# Patient Record
Sex: MALE | Race: WHITE | NOT HISPANIC OR LATINO | Employment: UNEMPLOYED | URBAN - METROPOLITAN AREA
[De-identification: names, ages, dates, MRNs, and addresses within clinical notes are randomized per-mention and may not be internally consistent; named-entity substitution may affect disease eponyms.]

---

## 2021-05-10 PROBLEM — R09.89 SUSPECTED CHF (CONGESTIVE HEART FAILURE): Status: ACTIVE | Noted: 2021-05-10

## 2021-05-10 PROBLEM — E88.09 HYPOALBUMINEMIA: Status: ACTIVE | Noted: 2021-05-10

## 2021-05-10 PROBLEM — I10 ESSENTIAL HYPERTENSION: Status: ACTIVE | Noted: 2021-05-10

## 2021-05-10 PROBLEM — R06.09 DYSPNEA ON EXERTION: Status: ACTIVE | Noted: 2021-05-10

## 2021-05-10 PROBLEM — R06.01 ORTHOPNEA: Status: ACTIVE | Noted: 2021-05-10

## 2021-05-10 PROBLEM — R94.31 ABNORMAL EKG: Status: ACTIVE | Noted: 2021-05-10

## 2021-05-10 PROBLEM — F19.11 HISTORY OF DRUG ABUSE IN REMISSION: Status: ACTIVE | Noted: 2021-05-10

## 2021-05-10 PROBLEM — Z87.898 HISTORY OF CHEST PAIN: Status: ACTIVE | Noted: 2021-05-10

## 2021-05-10 PROBLEM — I51.7 CARDIOMEGALY: Status: ACTIVE | Noted: 2021-05-10

## 2021-06-14 PROBLEM — I42.9 CARDIOMYOPATHY: Status: ACTIVE | Noted: 2021-06-14

## 2021-06-14 PROBLEM — R09.89 SUSPECTED CHF (CONGESTIVE HEART FAILURE): Status: RESOLVED | Noted: 2021-05-10 | Resolved: 2021-06-14

## 2021-07-29 PROBLEM — I51.7 RIGHT VENTRICULAR HYPERTROPHY: Status: ACTIVE | Noted: 2021-07-29

## 2021-07-29 PROBLEM — I50.20 HEART FAILURE WITH REDUCED EJECTION FRACTION, NYHA CLASS II: Status: ACTIVE | Noted: 2021-07-29

## 2021-07-29 PROBLEM — I51.7 RIGHT ATRIAL ENLARGEMENT: Status: ACTIVE | Noted: 2021-07-29

## 2021-07-29 PROBLEM — I42.9 CARDIOMYOPATHY: Status: RESOLVED | Noted: 2021-06-14 | Resolved: 2021-07-29

## 2021-07-29 PROBLEM — I51.7 LEFT ATRIAL ENLARGEMENT: Status: ACTIVE | Noted: 2021-07-29

## 2021-07-29 PROBLEM — I51.7 LVH (LEFT VENTRICULAR HYPERTROPHY): Status: ACTIVE | Noted: 2021-07-29

## 2021-07-29 PROBLEM — I51.7 RIGHT VENTRICULAR ENLARGEMENT: Status: ACTIVE | Noted: 2021-07-29

## 2021-07-29 PROBLEM — I51.89 DIASTOLIC DYSFUNCTION: Status: ACTIVE | Noted: 2021-07-29

## 2021-07-29 PROBLEM — I42.0 DCM (DILATED CARDIOMYOPATHY): Status: ACTIVE | Noted: 2021-07-29

## 2021-07-29 PROBLEM — I51.7 LEFT VENTRICULAR ENLARGEMENT: Status: ACTIVE | Noted: 2021-07-29

## 2021-07-29 PROBLEM — I51.7 CARDIOMEGALY: Status: RESOLVED | Noted: 2021-05-10 | Resolved: 2021-07-29

## 2021-07-29 PROBLEM — I34.0 NONRHEUMATIC MITRAL VALVE REGURGITATION: Status: ACTIVE | Noted: 2021-07-29

## 2021-07-29 PROBLEM — R06.01 ORTHOPNEA: Status: RESOLVED | Noted: 2021-05-10 | Resolved: 2021-07-29

## 2021-07-29 PROBLEM — I36.1 NONRHEUMATIC TRICUSPID VALVE REGURGITATION: Status: ACTIVE | Noted: 2021-07-29

## 2021-07-29 PROBLEM — J98.4 PULMONARY INSUFFICIENCY: Status: ACTIVE | Noted: 2021-07-29

## 2021-07-30 PROBLEM — E78.5 DYSLIPIDEMIA: Status: ACTIVE | Noted: 2021-07-30

## 2021-11-24 ENCOUNTER — NURSE TRIAGE (OUTPATIENT)
Dept: ADMINISTRATIVE | Facility: CLINIC | Age: 46
End: 2021-11-24

## 2022-03-21 PROBLEM — Z28.21 COVID-19 VACCINATION DECLINED: Status: ACTIVE | Noted: 2022-03-21

## 2022-03-21 PROBLEM — Z71.85 VACCINE COUNSELING: Status: ACTIVE | Noted: 2022-03-21

## 2022-03-21 PROBLEM — Z28.21 PNEUMOCOCCAL VACCINE REFUSED: Status: ACTIVE | Noted: 2022-03-21

## 2022-04-14 DIAGNOSIS — Z12.11 COLON CANCER SCREENING: ICD-10-CM

## 2022-07-11 ENCOUNTER — PATIENT MESSAGE (OUTPATIENT)
Dept: ADMINISTRATIVE | Facility: HOSPITAL | Age: 47
End: 2022-07-11

## 2022-08-10 ENCOUNTER — PATIENT OUTREACH (OUTPATIENT)
Dept: ADMINISTRATIVE | Facility: HOSPITAL | Age: 47
End: 2022-08-10

## 2022-08-10 NOTE — PROGRESS NOTES
Pt returned call in reference to overdue colorectal screening and agreed to do a fit kit. Pt also agreed to reschedule pcp appt.  FitKit was given to patient on 8/10/2022 2:43 PM

## 2022-08-10 NOTE — PROGRESS NOTES
Attempted to contact pt in reference to overdue colorectal screening. Unable to contact. No answer.

## 2022-09-01 ENCOUNTER — PATIENT OUTREACH (OUTPATIENT)
Dept: ADMINISTRATIVE | Facility: HOSPITAL | Age: 47
End: 2022-09-01

## 2022-09-01 ENCOUNTER — TELEPHONE (OUTPATIENT)
Dept: ADMINISTRATIVE | Facility: HOSPITAL | Age: 47
End: 2022-09-01

## 2022-09-01 NOTE — PROGRESS NOTES
Contacted pt to remind to return fit kit. Pt states he didn't received the kit yet .  Pt states he will get another kit at next pcp visit if he doesn't received it

## 2022-09-23 ENCOUNTER — PATIENT OUTREACH (OUTPATIENT)
Dept: ADMINISTRATIVE | Facility: HOSPITAL | Age: 47
End: 2022-09-23

## 2022-11-03 ENCOUNTER — PATIENT OUTREACH (OUTPATIENT)
Dept: ADMINISTRATIVE | Facility: HOSPITAL | Age: 47
End: 2022-11-03

## 2022-11-03 NOTE — PROGRESS NOTES
Attempted to contact pt in reference to overdue colorectal screening. Unable to contact. No answer message left.

## 2022-12-21 ENCOUNTER — PATIENT OUTREACH (OUTPATIENT)
Dept: ADMINISTRATIVE | Facility: HOSPITAL | Age: 47
End: 2022-12-21

## 2022-12-21 NOTE — PROGRESS NOTES
Contacted pt in reference to overdue colorectal screening. Pt was referral to gastro clinic but he states he didn't get letter for an appt. Mailed pt a new letter and informed pt to call number on his letter to setup his appt. Pt voiced understanding.

## 2023-03-28 PROBLEM — N52.2 DRUG-INDUCED ERECTILE DYSFUNCTION: Status: ACTIVE | Noted: 2023-03-28

## 2023-03-28 PROBLEM — Z95.810 ICD (IMPLANTABLE CARDIOVERTER-DEFIBRILLATOR) IN PLACE: Status: ACTIVE | Noted: 2023-03-28

## 2023-03-28 PROBLEM — R00.1 BRADYCARDIA: Status: ACTIVE | Noted: 2023-03-28

## 2023-05-11 ENCOUNTER — PATIENT OUTREACH (OUTPATIENT)
Dept: ADMINISTRATIVE | Facility: HOSPITAL | Age: 48
End: 2023-05-11

## 2024-02-08 ENCOUNTER — PATIENT OUTREACH (OUTPATIENT)
Dept: ADMINISTRATIVE | Facility: HOSPITAL | Age: 49
End: 2024-02-08

## 2024-08-30 ENCOUNTER — PATIENT OUTREACH (OUTPATIENT)
Dept: ADMINISTRATIVE | Facility: HOSPITAL | Age: 49
End: 2024-08-30

## 2024-10-17 ENCOUNTER — PATIENT OUTREACH (OUTPATIENT)
Dept: ADMINISTRATIVE | Facility: HOSPITAL | Age: 49
End: 2024-10-17

## 2024-10-17 NOTE — PROGRESS NOTES
Health Maintenance Due   Topic Date Due    TETANUS VACCINE  04/01/1993    Hemoglobin A1c (Diabetic Prevention Screening)  Never done    Colorectal Cancer Screening  Never done    Shingles Vaccine (1 of 2) Never done    Influenza Vaccine (1) 09/01/2024    COVID-19 Vaccine (1 - 2024-25 season) Never done   Attempted to contact pt, he is on the colon cancer screening gap report. His last PCP visit was in June 2023. Number no longer in service.